# Patient Record
Sex: FEMALE | Race: WHITE | URBAN - METROPOLITAN AREA
[De-identification: names, ages, dates, MRNs, and addresses within clinical notes are randomized per-mention and may not be internally consistent; named-entity substitution may affect disease eponyms.]

---

## 2023-11-07 ENCOUNTER — OFFICE VISIT (OUTPATIENT)
Dept: OPHTHALMOLOGY | Facility: CLINIC | Age: 53
End: 2023-11-07
Payer: COMMERCIAL

## 2023-11-07 DIAGNOSIS — H04.123 DRY EYES, BILATERAL: Primary | ICD-10-CM

## 2023-11-07 DIAGNOSIS — H25.813 COMBINED FORMS OF AGE-RELATED CATARACT OF BOTH EYES: ICD-10-CM

## 2023-11-07 DIAGNOSIS — H52.7 REFRACTION ERROR: ICD-10-CM

## 2023-11-07 DIAGNOSIS — H50.812 DUANE'S SYNDROME OF BOTH EYES: ICD-10-CM

## 2023-11-07 DIAGNOSIS — H50.811 DUANE'S SYNDROME OF BOTH EYES: ICD-10-CM

## 2023-11-07 PROCEDURE — 92015 DETERMINE REFRACTIVE STATE: CPT | Performed by: OPHTHALMOLOGY

## 2023-11-07 PROCEDURE — 99204 OFFICE O/P NEW MOD 45 MIN: CPT | Performed by: OPHTHALMOLOGY

## 2023-11-07 PROCEDURE — 99214 OFFICE O/P EST MOD 30 MIN: CPT | Performed by: OPHTHALMOLOGY

## 2023-11-07 RX ORDER — TALC
POWDER (GRAM) TOPICAL
COMMUNITY

## 2023-11-07 RX ORDER — ALENDRONATE SODIUM 70 MG/1
70 TABLET ORAL
COMMUNITY
Start: 2021-12-10

## 2023-11-07 RX ORDER — ANASTROZOLE 1 MG/1
1 TABLET ORAL EVERY 24 HOURS
COMMUNITY
Start: 2022-01-09

## 2023-11-07 RX ORDER — IBUPROFEN 100 MG/5ML
500 SUSPENSION, ORAL (FINAL DOSE FORM) ORAL DAILY
COMMUNITY

## 2023-11-07 RX ORDER — ACETAMINOPHEN 500 MG
5000 TABLET ORAL
COMMUNITY

## 2023-11-07 RX ORDER — TRETINOIN 0.25 MG/G
CREAM TOPICAL
COMMUNITY
Start: 2019-10-08

## 2023-11-07 RX ORDER — MUPIROCIN 20 MG/G
OINTMENT TOPICAL
COMMUNITY
Start: 2022-11-18

## 2023-11-07 RX ORDER — ZINC SULFATE 50(220)MG
50 CAPSULE ORAL
COMMUNITY

## 2023-11-07 RX ORDER — ACETAMINOPHEN AND PHENYLEPHRINE HCL 325; 5 MG/1; MG/1
1 TABLET ORAL
COMMUNITY

## 2023-11-07 RX ORDER — FLUTICASONE PROPIONATE 50 MCG
1 SPRAY, SUSPENSION (ML) NASAL DAILY
COMMUNITY

## 2023-11-07 RX ORDER — MULTIVITAMIN
TABLET ORAL
COMMUNITY
Start: 2004-04-07

## 2023-11-07 RX ORDER — CALCIUM CARBONATE 600 MG
600 TABLET ORAL
COMMUNITY
Start: 2004-04-07

## 2023-11-07 ASSESSMENT — REFRACTION_MANIFEST
METHOD_AUTOREFRACTION: 1
OS_SPHERE: -1.00
OS_CYLINDER: -1.00
OD_SPHERE: -1.25
OD_CYLINDER: -2.25
OS_AXIS: 120
OD_AXIS: 095

## 2023-11-07 ASSESSMENT — TONOMETRY
OS_IOP_MMHG: 16
IOP_METHOD: GOLDMANN APPLANATION
OD_IOP_MMHG: 16

## 2023-11-07 ASSESSMENT — VISUAL ACUITY
CORRECTION_TYPE: GLASSES
OD_CC+: -1
OD_CC: 20/20
OS_CC: 20/20
METHOD: SNELLEN - SINGLE

## 2023-11-07 ASSESSMENT — REFRACTION_WEARINGRX
OS_AXIS: 114
SPECS_TYPE: SVL
OS_SPHERE: -1.00
OD_CYLINDER: -1.25
OS_CYLINDER: -0.75
OD_SPHERE: -1.50
OD_AXIS: 097

## 2023-11-07 ASSESSMENT — KERATOMETRY
OS_AXISANGLE_DEGREES: 90
OS_AXISANGLE2_DEGREES: 180
OD_K2POWER_DIOPTERS: 46.75
OD_AXISANGLE_DEGREES: 20
OD_K1POWER_DIOPTERS: 45.25
OS_K1POWER_DIOPTERS: 46.00
OS_K2POWER_DIOPTERS: 46.00
METHOD_AUTO_MANUAL: AUTOMATED
OD_AXISANGLE2_DEGREES: 110

## 2023-11-07 ASSESSMENT — ENCOUNTER SYMPTOMS
ALLERGIC/IMMUNOLOGIC NEGATIVE: 0
HEMATOLOGIC/LYMPHATIC NEGATIVE: 0
GASTROINTESTINAL NEGATIVE: 0
ENDOCRINE NEGATIVE: 0
CONSTITUTIONAL NEGATIVE: 0
CARDIOVASCULAR NEGATIVE: 0
EYES NEGATIVE: 0
PSYCHIATRIC NEGATIVE: 0
RESPIRATORY NEGATIVE: 0
LOSS OF SENSATION IN FEET: 0
NEUROLOGICAL NEGATIVE: 0
MUSCULOSKELETAL NEGATIVE: 1
OCCASIONAL FEELINGS OF UNSTEADINESS: 0
DEPRESSION: 0

## 2023-11-07 ASSESSMENT — SLIT LAMP EXAM - LIDS
COMMENTS: 1+ BLEPHARITIS, 1+ DERMATOCHALASIS - UPPER LID
COMMENTS: 1+ BLEPHARITIS, 1+ DERMATOCHALASIS - UPPER LID

## 2023-11-07 ASSESSMENT — EXTERNAL EXAM - LEFT EYE: OS_EXAM: 1+ BROW PTOSIS

## 2023-11-07 ASSESSMENT — CUP TO DISC RATIO
OD_RATIO: 0.3
OS_RATIO: 0.3

## 2023-11-07 ASSESSMENT — PAIN SCALES - GENERAL: PAINLEVEL: 0-NO PAIN

## 2023-11-07 ASSESSMENT — EXTERNAL EXAM - RIGHT EYE: OD_EXAM: 1+ BROW PTOSIS

## 2023-11-07 ASSESSMENT — PATIENT HEALTH QUESTIONNAIRE - PHQ9
SUM OF ALL RESPONSES TO PHQ9 QUESTIONS 1 AND 2: 0
2. FEELING DOWN, DEPRESSED OR HOPELESS: NOT AT ALL
1. LITTLE INTEREST OR PLEASURE IN DOING THINGS: NOT AT ALL

## 2023-11-07 NOTE — PATIENT INSTRUCTIONS
Dispense progressive spectacles for daily use.  Discussed at length dry eyes and daily artificial tear usage.  Follow up full exam one to two years.

## 2023-11-07 NOTE — PROGRESS NOTES
Subjective   Patient ID: Dolores Jones is a 53 y.o. female.    Chief Complaint    Dry Eye       HPI       Dry Eye    In both eyes.             Comments    Using PF Refresh every day, not regularly, worse when driving    Complete exam.  No recent changes in health history or meds.  Vision is mostly unchanged but sometimes straining.  Only wears distance only.  Also many dry eye sxs.  Using occasionally art tears.            Last edited by Neymar Rodriguez MD on 11/7/2023  2:50 PM.        No current outpatient medications on file. (Ophthalmology pharm classes)       Current Outpatient Medications (Other)   Medication Sig Dispense Refill    anastrozole (Arimidex) 1 mg tablet Take 1 tablet (1 mg total) by mouth once every 24 hours.      ascorbic acid (Vitamin C) 1,000 mg tablet 0.5 tablets (500 mg) once daily.      biotin 10,000 mcg capsule Take 1 capsule (10 mg) by mouth.      calcium carbonate 600 mg calcium (1,500 mg) tablet Take 1 tablet (600 mg) by mouth.      cholecalciferol (Vitamin D-3) 5,000 Units tablet Take 1 tablet (5,000 Units) by mouth once daily.      DOCOSAHEXAENOIC ACID ORAL 1 capsule; 1200 MG; Once a day      fluticasone (Flonase Allergy Relief) 50 mcg/actuation nasal spray Administer 1 spray into each nostril once daily.      Fosamax 70 mg tablet Take 1 tablet (70 mg) by mouth.      Lactobacillus acidophilus 10 billion cell capsule -      magnesium oxide (Mag-Ox) 250 mg magnesium tablet 250 MG; Once a day      multivitamin tablet Take one(1) tablet daily.      mupirocin (Bactroban) 2 % ointment as needed. AS DIRECTED      tretinoin (Retin-A) 0.025 % cream Apply topically.      tumeric-ging-olive-oreg-capryl 100 mg-150 mg- 50 mg-150 mg capsule Take by mouth once daily.      VITAMIN B COMPLEX ORAL Take by mouth.      zinc sulfate (Zincate) 220 (50 Zn) MG capsule Take 50 mg by mouth once daily.         Objective   Base Eye Exam       Visual Acuity (Snellen - Single)         Right Left Both    Dist cc  20/20 -1 20/20     Near sc   J1+      Correction: Glasses              Tonometry (Goldmann Applanation, 2:06 PM)         Right Left    Pressure 16 16              Pupils         Dark Shape React APD    Right 4 Round Minimal None    Left 4 Round Minimal None              Extraocular Movement         Right Left     Full Full              Dilation       Both eyes: 1% Tropic 2.5% Phen @ 2:06 PM                  Additional Tests       Keratometry (Automated)         K1 Axis K2 Axis    Right 45.25 110 46.75 20    Left 46.00 180 46.00 90                  Slit Lamp and Fundus Exam       External Exam         Right Left    External 1+ Brow ptosis 1+ Brow ptosis              Slit Lamp Exam         Right Left    Lids/Lashes 1+ Blepharitis, 1+ Dermatochalasis - upper lid 1+ Blepharitis, 1+ Dermatochalasis - upper lid    Conjunctiva/Sclera normal bulbar and palepbral conjunctiva normal bulbar and palepbral conjunctiva    Cornea normal epi/stroma/endo and tear film normal epi/stroma/endo and tear film    Anterior Chamber normal anterior chamber, deep and quiet normal anterior chamber, deep and quiet    Iris iris normal iris normal    Lens Trace Nuclear sclerosis Trace Nuclear sclerosis    Anterior Vitreous vitreous clear and normal vitreous clear and normal              Fundus Exam         Right Left    Disc C:D Ratio 0.3 C:D Ratio 0.3    C/D Ratio 0.3 0.3    Macula normal macula normal macula    Vessels normal retinal vessels normal retinal vessels    Periphery normal retinal periphery normal retinal periphery                  Refraction       Wearing Rx         Sphere Cylinder Axis    Right -1.50 -1.25 097    Left -1.00 -0.75 114      Type: SVL              Manifest Refraction (Auto)         Sphere Cylinder Axis    Right -1.25 -2.25 095    Left -1.00 -1.00 120              Final Rx         Sphere Cylinder [place] Dist [place]    Right -1.50 -1.25 100 2.00    Left -1.00 -1.00 115 2.00      Type: progressive    Expiration Date: 11/7/2025     Pupillary Distance: 57                    Assessment/Plan   Problem List Items Addressed This Visit          Eye/Vision problems    Dry eyes, bilateral - Primary     Discussed art tears and usage.          Combined forms of age-related cataract of both eyes    Refraction error     Dispense spec rx.          Duane's syndrome of both eyes